# Patient Record
Sex: FEMALE | Race: WHITE
[De-identification: names, ages, dates, MRNs, and addresses within clinical notes are randomized per-mention and may not be internally consistent; named-entity substitution may affect disease eponyms.]

---
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
## 2019-02-15 ENCOUNTER — HOSPITAL ENCOUNTER (EMERGENCY)
Dept: HOSPITAL 80 - CED | Age: 19
Discharge: HOME | End: 2019-02-15
Payer: COMMERCIAL

## 2019-02-15 VITALS — DIASTOLIC BLOOD PRESSURE: 86 MMHG | SYSTOLIC BLOOD PRESSURE: 128 MMHG

## 2019-02-15 DIAGNOSIS — R55: Primary | ICD-10-CM

## 2019-02-15 DIAGNOSIS — F41.9: ICD-10-CM

## 2019-02-15 NOTE — EDPHY
H & P


Stated Complaint: Dizzy, feeling shakey, and unwitnessed LOC at home today at 

approx. 1130.


Time Seen by Provider: 02/15/19 12:17


HPI/ROS: 





Chief Complaint:  Possible syncope





HPI:  18-year-old woman had an episode this morning after getting out of bed 

where she became lightheaded, her vision, great and she lost consciousness.  

She was unresponsive for an unknown amount of time as she was alone.  She does 

not feel that she was unconscious for very long.  She woke up on the bathroom 

floor.  She was not incontinent.  She did not bite her tongue.  She states she 

woke up pretty quickly and did not feel confused.  She called her mom who 

picked her up and brought her here.  This episode has occurred approximately 45 

min ago.  She does have a history of syncope associated with pain in the past.  

Also has a history of anxiety.  Did have an episode 3 months ago of weight loss 

abdominal pain which has since resolved.  She had extensive workup including CT 

scanning, endoscopy and laboratory evaluations which were unremarkable.  

Patient was seen by her OBGYN last week and had a normal exam.  She does have 

an IUD in place.  No abdominal pain.  She is currently without complaint.





ROS:  10 systems were reviewed and were negative except those elements noted in 

the HPI.





PMH:  Anxiety





Social History: No smoking, no alcohol,  no recreational drug use





Family History: non-contributory





Physical Exam:


Gen: Awake, Alert, No Distress


HEENT:  


     Nose: no rhinorrhea


     Eyes: PERRLA, EOMI


     Mouth: Moist mucosa 


Neck: Supple, no JVD


Chest: nontender, lungs clear to auscultation


Heart: S1, S2 normal, no murmur


Abd: Soft, non-tender, no guarding


Back: no CVA tenderness, no midline tenderness 


Ext: no edema, non-tender


Skin: no rash


Neuro: CN II-XII intact, Sensation grossly intact, Strength 5/5 in bilateral 

upper and lower extremities








- Personal History


LMP (Females 10-55): IUD In Place


Current Tetanus Diphtheria and Acellular Pertussis (TDAP): Yes


Tetanus Vaccine Date: within 10 years





- Medical/Surgical History


Hx Asthma: No


Hx Chronic Respiratory Disease: No


Hx Diabetes: No


Hx Cardiac Disease: No


Hx Renal Disease: No


Hx Cirrhosis: No


Hx Alcoholism: No


Hx HIV/AIDS: No


Hx Splenectomy or Spleen Trauma: No


Other PMH: Gluten intolerance, last year had syncope, GERD, anxiety





- Social History


Smoking Status: Never smoked


Constitutional: 


 Initial Vital Signs











Temperature (C)  36.9 C   02/15/19 12:13


 


Heart Rate  94   02/15/19 12:13


 


Respiratory Rate  16   02/15/19 12:13


 


Blood Pressure  136/92 H  02/15/19 12:13


 


O2 Sat (%)  96   02/15/19 12:13








 











O2 Delivery Mode               Room Air














Allergies/Adverse Reactions: 


 





metronidazole [From Flagyl] Allergy (Verified 02/15/19 12:15)


 Pt report hives


venom-honey bee [bee venom (honey bee)] Allergy (Verified 02/15/19 12:15)


 Pt reports hives








Home Medications: 














 Medication  Instructions  Recorded


 


Iud  08/02/17


 


QUEtiapine FUMARATE [Seroquel 100  08/02/17





mg (*)]  


 


Lexapro  02/15/19














Medical Decision Making





- Diagnostics


EKG Interpretation: 





ECG time 12:34 p.m., sinus rhythm with a rate of 78, borderline right axis 

deviation axis, normal intervals, no acute ST or T-wave changes.  Impression:  

Normal ECG.


ED Course/Re-evaluation: 





18-year-old woman with history suggestive of syncope.  She has no historical or 

exam findings suggestive of seizure.  She has a normal exam now.  She is not 

pregnant.  Urinalysis is negative.  ECG is normal.  Blood sugars normal.  She 

is not acidotic.  She is awake alert and appropriate.  Patient has been 

cautioned regarding getting up rapidly in to sit at the bed before she gets up.

  Will discharge with follow-up with primary care physician.





- Data Points


Laboratory Results: 


 











  02/15/19 02/15/19





  12:43 12:22


 


POC Sodium  141 mEq/L mEq/L  





   (135-145)  


 


POC Potassium  3.8 mEq/L mEq/L  





   (3.3-5.0)  


 


POC Chloride  106.0 mEq/L mEq/L  





   ()  


 


POC Total CO2  26 mEq/L mEq/L  





   (22-31)  


 


POC BUN  9 mg/dL mg/dL  





   (7-23)  


 


POC Creatinine  0.5 mg/dL L mg/dL  





   (0.6-1.0)  


 


POC Glucose  108 mg/dL H mg/dL  89 mg/dL mg/dL





   ()   () 


 


POC Calcium  9.3 mg/dL mg/dL  





   (8.5-10.4)  











Point of Care Test Results: 


 Chemistry











  02/15/19 02/15/19





  12:43 12:22


 


POC Sodium  141 mEq/L mEq/L  





   (135-145)  


 


POC Potassium  3.8 mEq/L mEq/L  





   (3.3-5.0)  


 


POC Chloride  106.0 mEq/L mEq/L  





   ()  


 


POC Total CO2  26 mEq/L mEq/L  





   (22-31)  


 


POC BUN  9 mg/dL mg/dL  





   (7-23)  


 


POC Creatinine  0.5 mg/dL L mg/dL  





   (0.6-1.0)  


 


POC Glucose  108 mg/dL H mg/dL  89 mg/dL mg/dL





   ()   () 


 


POC Calcium  9.3 mg/dL mg/dL  





   (8.5-10.4)  








 Basic Metabolic Panel











BMP Collection Date            2/15/19


 


BMP Collection Time            12:37











 Urine Pregnancy











Collection Date                02/15/19


 


Collection Time                12:41


 


HCG Results                    Negative











 Urine Dip











Collection Date                02/15/19


 


Collection Time                12:41


 


Specific Gravity (1.002-1.030) 1.015


 


PH (5.0-7.5)                   7.5


 


Leukocytes (Negative)          Negative


 


Nitrites (Negative)            Negative


 


Protein (Negative)             Negative


 


Glucose (Negative)             Negative


 


Ketones (Negative)             Negative


 


Urobilnogen (0.2-1.0 EU)       0.2


 


Bilirubin (Negative)           Negative


 


Blood (Negative)               Negative

















Departure





- Departure


Disposition: Home, Routine, Self-Care


Clinical Impression: 


 Syncope





Condition: Good


Instructions:  Syncope (ED)


Additional Instructions: 


Follow up with primary care physician in 3-4 days for further evaluation.


Return to the emergency department for further episodes of fainting, chest pain

, palpitations, abdominal pain, or any other concerns.


Referrals: 


NONE *PRIMARY CARE P,. [Primary Care Provider] - As per Instructions

## 2019-02-15 NOTE — CPEKG
Test Reason : OPEN

Blood Pressure : ***/*** mmHG

Vent. Rate : 078 BPM     Atrial Rate : 078 BPM

   P-R Int : 142 ms          QRS Dur : 098 ms

    QT Int : 388 ms       P-R-T Axes : 030 094 013 degrees

   QTc Int : 442 ms

 

Sinus rhythm

Borderline right axis deviation

 

Confirmed by Arnulfo Devine ([id number]) on 2/15/2019 1:40:59 PM

 

Referred By: Arnulfo Devine           Confirmed By:Arnulfo Devine